# Patient Record
Sex: FEMALE | Race: WHITE | Employment: UNEMPLOYED | URBAN - METROPOLITAN AREA
[De-identification: names, ages, dates, MRNs, and addresses within clinical notes are randomized per-mention and may not be internally consistent; named-entity substitution may affect disease eponyms.]

---
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
## 2023-03-22 ENCOUNTER — APPOINTMENT (OUTPATIENT)
Dept: GENERAL RADIOLOGY | Age: 18
End: 2023-03-22
Attending: PEDIATRICS
Payer: MEDICAID

## 2023-03-22 ENCOUNTER — HOSPITAL ENCOUNTER (EMERGENCY)
Age: 18
Discharge: HOME | End: 2023-03-22
Attending: PEDIATRICS
Payer: MEDICAID

## 2023-03-22 VITALS
SYSTOLIC BLOOD PRESSURE: 118 MMHG | BODY MASS INDEX: 36.33 KG/M2 | TEMPERATURE: 97.8 F | DIASTOLIC BLOOD PRESSURE: 90 MMHG | WEIGHT: 198.63 LBS | RESPIRATION RATE: 29 BRPM | HEART RATE: 127 BPM | OXYGEN SATURATION: 99 %

## 2023-03-22 DIAGNOSIS — R07.89 ATYPICAL CHEST PAIN: Primary | ICD-10-CM

## 2023-03-22 DIAGNOSIS — J06.9 UPPER RESPIRATORY TRACT INFECTION, UNSPECIFIED TYPE: ICD-10-CM

## 2023-03-22 LAB
ALBUMIN SERPL-MCNC: 3.7 G/DL (ref 3.5–5)
ALBUMIN/GLOB SERPL: 0.9 (ref 1.1–2.2)
ALP SERPL-CCNC: 88 U/L (ref 45–117)
ALT SERPL-CCNC: 29 U/L (ref 12–78)
ANION GAP SERPL CALC-SCNC: 2 MMOL/L (ref 5–15)
AST SERPL-CCNC: 17 U/L (ref 15–37)
BASOPHILS # BLD: 0 K/UL (ref 0–0.1)
BASOPHILS NFR BLD: 0 % (ref 0–1)
BILIRUB SERPL-MCNC: 0.3 MG/DL (ref 0.2–1)
BUN SERPL-MCNC: 9 MG/DL (ref 6–20)
BUN/CREAT SERPL: 15 (ref 12–20)
CALCIUM SERPL-MCNC: 9.8 MG/DL (ref 8.5–10.1)
CHLORIDE SERPL-SCNC: 110 MMOL/L (ref 97–108)
CO2 SERPL-SCNC: 26 MMOL/L (ref 21–32)
COMMENT, HOLDF: NORMAL
CREAT SERPL-MCNC: 0.62 MG/DL (ref 0.55–1.02)
D DIMER PPP FEU-MCNC: 0.36 MG/L FEU (ref 0–0.65)
DATE LAST DOSE: NORMAL
DIFFERENTIAL METHOD BLD: NORMAL
EOSINOPHIL # BLD: 0.3 K/UL (ref 0–0.4)
EOSINOPHIL NFR BLD: 3 % (ref 0–7)
ERYTHROCYTE [DISTWIDTH] IN BLOOD BY AUTOMATED COUNT: 13.2 % (ref 11.5–14.5)
GLOBULIN SER CALC-MCNC: 4 G/DL (ref 2–4)
GLUCOSE SERPL-MCNC: 84 MG/DL (ref 65–100)
HCT VFR BLD AUTO: 40.1 % (ref 35–47)
HGB BLD-MCNC: 12.6 G/DL (ref 11.5–16)
IMM GRANULOCYTES # BLD AUTO: 0 K/UL (ref 0–0.04)
IMM GRANULOCYTES NFR BLD AUTO: 0 % (ref 0–0.5)
LITHIUM SERPL-SCNC: 0.88 MMOL/L (ref 0.6–1.2)
LYMPHOCYTES # BLD: 1.5 K/UL (ref 0.8–3.5)
LYMPHOCYTES NFR BLD: 18 % (ref 12–49)
MCH RBC QN AUTO: 26 PG (ref 26–34)
MCHC RBC AUTO-ENTMCNC: 31.4 G/DL (ref 30–36.5)
MCV RBC AUTO: 82.7 FL (ref 80–99)
MONOCYTES # BLD: 0.7 K/UL (ref 0–1)
MONOCYTES NFR BLD: 9 % (ref 5–13)
NEUTS SEG # BLD: 5.8 K/UL (ref 1.8–8)
NEUTS SEG NFR BLD: 70 % (ref 32–75)
NRBC # BLD: 0 K/UL (ref 0–0.01)
NRBC BLD-RTO: 0 PER 100 WBC
PLATELET # BLD AUTO: 254 K/UL (ref 150–400)
PMV BLD AUTO: 9.5 FL (ref 8.9–12.9)
POTASSIUM SERPL-SCNC: 3.9 MMOL/L (ref 3.5–5.1)
PROT SERPL-MCNC: 7.7 G/DL (ref 6.4–8.2)
RBC # BLD AUTO: 4.85 M/UL (ref 3.8–5.2)
REPORTED DOSE,DOSE: NORMAL UNITS
REPORTED DOSE/TIME,TMG: NORMAL
SAMPLES BEING HELD,HOLD: NORMAL
SODIUM SERPL-SCNC: 138 MMOL/L (ref 136–145)
TROPONIN I SERPL HS-MCNC: <3 NG/L (ref 0–37)
WBC # BLD AUTO: 8.3 K/UL (ref 3.6–11)

## 2023-03-22 PROCEDURE — 74011250636 HC RX REV CODE- 250/636: Performed by: PEDIATRICS

## 2023-03-22 PROCEDURE — 80178 ASSAY OF LITHIUM: CPT

## 2023-03-22 PROCEDURE — 99285 EMERGENCY DEPT VISIT HI MDM: CPT

## 2023-03-22 PROCEDURE — 36415 COLL VENOUS BLD VENIPUNCTURE: CPT

## 2023-03-22 PROCEDURE — 85379 FIBRIN DEGRADATION QUANT: CPT

## 2023-03-22 PROCEDURE — 85025 COMPLETE CBC W/AUTO DIFF WBC: CPT

## 2023-03-22 PROCEDURE — 84484 ASSAY OF TROPONIN QUANT: CPT

## 2023-03-22 PROCEDURE — 93005 ELECTROCARDIOGRAM TRACING: CPT

## 2023-03-22 PROCEDURE — 80053 COMPREHEN METABOLIC PANEL: CPT

## 2023-03-22 PROCEDURE — 96374 THER/PROPH/DIAG INJ IV PUSH: CPT

## 2023-03-22 PROCEDURE — 71045 X-RAY EXAM CHEST 1 VIEW: CPT

## 2023-03-22 PROCEDURE — 74011250637 HC RX REV CODE- 250/637: Performed by: PEDIATRICS

## 2023-03-22 RX ORDER — IBUPROFEN 600 MG/1
600 TABLET ORAL
Qty: 20 TABLET | Refills: 0 | Status: SHIPPED | OUTPATIENT
Start: 2023-03-22

## 2023-03-22 RX ORDER — DIPHENHYDRAMINE HCL 25 MG
25 TABLET ORAL
Qty: 20 TABLET | Refills: 0 | Status: SHIPPED | OUTPATIENT
Start: 2023-03-22

## 2023-03-22 RX ORDER — DIPHENHYDRAMINE HCL 25 MG
50 CAPSULE ORAL
Status: COMPLETED | OUTPATIENT
Start: 2023-03-22 | End: 2023-03-22

## 2023-03-22 RX ORDER — OXYMETAZOLINE HCL 0.05 %
2 SPRAY, NON-AEROSOL (ML) NASAL 2 TIMES DAILY
Qty: 1 EACH | Refills: 0 | Status: SHIPPED | OUTPATIENT
Start: 2023-03-22 | End: 2023-03-25

## 2023-03-22 RX ORDER — KETOROLAC TROMETHAMINE 30 MG/ML
15 INJECTION, SOLUTION INTRAMUSCULAR; INTRAVENOUS
Status: COMPLETED | OUTPATIENT
Start: 2023-03-22 | End: 2023-03-22

## 2023-03-22 RX ORDER — OXYMETAZOLINE HCL 0.05 %
1 SPRAY, NON-AEROSOL (ML) NASAL
Status: COMPLETED | OUTPATIENT
Start: 2023-03-22 | End: 2023-03-22

## 2023-03-22 RX ADMIN — SODIUM CHLORIDE 1000 ML: 9 INJECTION, SOLUTION INTRAVENOUS at 10:20

## 2023-03-22 RX ADMIN — KETOROLAC TROMETHAMINE 15 MG: 30 INJECTION, SOLUTION INTRAMUSCULAR; INTRAVENOUS at 10:24

## 2023-03-22 RX ADMIN — OXYMETAZOLINE HCL 1 SPRAY: 0.05 SPRAY NASAL at 10:22

## 2023-03-22 RX ADMIN — DIPHENHYDRAMINE HYDROCHLORIDE 50 MG: 25 CAPSULE ORAL at 10:24

## 2023-03-22 NOTE — ED TRIAGE NOTES
To ED with chest pain started this past weekend, today while at day support continued to complain of pain.

## 2023-03-22 NOTE — ED PROVIDER NOTES
The history is provided by the patient. Chest Pain (Angina)   This is a new problem. The current episode started 2 days ago. The problem has not changed since onset. The problem occurs constantly. The pain is associated with normal activity. The pain is mild. The quality of the pain is described as pressure-like. The pain does not radiate. The symptoms are aggravated by deep breathing. Associated symptoms include cough. Pertinent negatives include no abdominal pain, no diaphoresis, no dizziness, no headaches, no malaise/fatigue, no nausea, no shortness of breath, no vomiting and no weakness. Associated symptoms comments: Stuffy nose and dry mouth. She has tried nothing for the symptoms. Risk factors include obesity (Hx of high HR, was opn a med, can't remeber what, stopped a while ago. Lives in group home, but they did not hav a record of this med. On lithium but may not be taking it). [facility]    Past Medical History:   Diagnosis Date    ADHD     Autism spectrum     [facility]     CARL (generalized anxiety disorder)     MDD (major depressive disorder)     Oppositional defiant behavior        No past surgical history on file. History reviewed. No pertinent family history.     Social History     Socioeconomic History    Marital status: SINGLE     Spouse name: Not on file    Number of children: Not on file    Years of education: Not on file    Highest education level: Not on file   Occupational History    Not on file   Tobacco Use    Smoking status: Unknown    Smokeless tobacco: Not on file   Substance and Sexual Activity    Alcohol use: Not on file    Drug use: Not on file    Sexual activity: Not on file   Other Topics Concern    Not on file   Social History Narrative    Not on file     Social Determinants of Health     Financial Resource Strain: Not on file   Food Insecurity: Not on file   Transportation Needs: Not on file   Physical Activity: Not on file   Stress: Not on file   Social Connections: Not on file   Intimate Partner Violence: Not on file   Housing Stability: Not on file         ALLERGIES: Seroquel [quetiapine]    Review of Systems   Constitutional:  Negative for diaphoresis and malaise/fatigue. Respiratory:  Positive for cough. Negative for shortness of breath. Cardiovascular:  Positive for chest pain. Gastrointestinal:  Negative for abdominal pain, nausea and vomiting. Neurological:  Negative for dizziness, weakness and headaches. ROS limited by age    Vitals:    03/22/23 0952 03/22/23 0955   BP: (!) 118/90    Pulse: 96    Resp: 18    Temp:  97.8 °F (36.6 °C)   SpO2: 99%    Weight: 90.1 kg (198 lb 10.2 oz)             Physical Exam   Physical Exam   Constitutional: Appears well-developed and well-nourished. active. No distress. HENT:   Head: NCAT  Ears: Right Ear: Tympanic membrane normal. Left Ear: Tympanic membrane normal.   Nose: Nose normal. No nasal discharge. Swollen turbinates  Mouth/Throat: Mucous membranes are moist. Pharynx is normal. Dry lips  Eyes: Conjunctivae are normal. Right eye exhibits no discharge. Left eye exhibits no discharge. Neck: Normal range of motion. Neck supple. Cardiovascular: Normal rate, regular rhythm, S1 normal and S2 normal. No murmur   2+ distal pulses   Pulmonary/Chest: Effort normal and breath sounds normal. No nasal flaring or stridor. No respiratory distress. no wheezes. no rhonchi. no rales. no retraction. Some mild chest tenderness  Abdominal: Soft. Obese. No tenderness. no guarding. No hernia. No masses or HSM  Musculoskeletal: Normal range of motion. no edema, no tenderness, no deformity and no signs of injury. Lymphadenopathy:   no cervical adenopathy. Neurological:  alert. normal strength. normal muscle tone. No focal defecits  Skin: Skin is warm and dry. Capillary refill takes less than 3 seconds. Turgor is normal. No petechiae, no purpura and no rash noted. No cyanosis.     Medical Decision Making  Amount and/or Complexity of Data Reviewed  Independent Historian: caregiver  External Data Reviewed: labs and notes. Labs: ordered. Radiology: ordered. Decision-making details documented in ED Course. ECG/medicine tests: ordered and independent interpretation performed. Decision-making details documented in ED Course. Risk  OTC drugs. Prescription drug management. PAtient looks well. Seems more like a URI with some cough and chest wall tenderness. Tachy to just over 100. Will check trop, D dimer, CXR and labs. Afrin and benadryl now. ED EKG interpretation:  Rhythm: normal sinus rhythm with tachy; and regular . Rate (approx.): 109; Axis: normal; QRS interval: normal ; ST/T wave: normal; QTc 444; This EKG was interpreted by Cris Jo MD, ED Provider. Recent Results (from the past 24 hour(s))   EKG, 12 LEAD, INITIAL    Collection Time: 03/22/23  9:43 AM   Result Value Ref Range    Ventricular Rate 109 BPM    Atrial Rate 109 BPM    P-R Interval 144 ms    QRS Duration 70 ms    Q-T Interval 330 ms    QTC Calculation (Bezet) 444 ms    Calculated P Axis 39 degrees    Calculated R Axis 9 degrees    Calculated T Axis 32 degrees    Diagnosis       Sinus tachycardia  Otherwise normal ECG  No previous ECGs available     CBC WITH AUTOMATED DIFF    Collection Time: 03/22/23 10:20 AM   Result Value Ref Range    WBC 8.3 3.6 - 11.0 K/uL    RBC 4.85 3.80 - 5.20 M/uL    HGB 12.6 11.5 - 16.0 g/dL    HCT 40.1 35.0 - 47.0 %    MCV 82.7 80.0 - 99.0 FL    MCH 26.0 26.0 - 34.0 PG    MCHC 31.4 30.0 - 36.5 g/dL    RDW 13.2 11.5 - 14.5 %    PLATELET 442 414 - 442 K/uL    MPV 9.5 8.9 - 12.9 FL    NRBC 0.0 0  WBC    ABSOLUTE NRBC 0.00 0.00 - 0.01 K/uL    NEUTROPHILS 70 32 - 75 %    LYMPHOCYTES 18 12 - 49 %    MONOCYTES 9 5 - 13 %    EOSINOPHILS 3 0 - 7 %    BASOPHILS 0 0 - 1 %    IMMATURE GRANULOCYTES 0 0.0 - 0.5 %    ABS. NEUTROPHILS 5.8 1.8 - 8.0 K/UL    ABS. LYMPHOCYTES 1.5 0.8 - 3.5 K/UL    ABS.  MONOCYTES 0.7 0.0 - 1.0 K/UL    ABS. EOSINOPHILS 0.3 0.0 - 0.4 K/UL    ABS. BASOPHILS 0.0 0.0 - 0.1 K/UL    ABS. IMM. GRANS. 0.0 0.00 - 0.04 K/UL    DF AUTOMATED     METABOLIC PANEL, COMPREHENSIVE    Collection Time: 03/22/23 10:20 AM   Result Value Ref Range    Sodium 138 136 - 145 mmol/L    Potassium 3.9 3.5 - 5.1 mmol/L    Chloride 110 (H) 97 - 108 mmol/L    CO2 26 21 - 32 mmol/L    Anion gap 2 (L) 5 - 15 mmol/L    Glucose 84 65 - 100 mg/dL    BUN 9 6 - 20 MG/DL    Creatinine 0.62 0.55 - 1.02 MG/DL    BUN/Creatinine ratio 15 12 - 20      eGFR >60 >60 ml/min/1.73m2    Calcium 9.8 8.5 - 10.1 MG/DL    Bilirubin, total 0.3 0.2 - 1.0 MG/DL    ALT (SGPT) 29 12 - 78 U/L    AST (SGOT) 17 15 - 37 U/L    Alk. phosphatase 88 45 - 117 U/L    Protein, total 7.7 6.4 - 8.2 g/dL    Albumin 3.7 3.5 - 5.0 g/dL    Globulin 4.0 2.0 - 4.0 g/dL    A-G Ratio 0.9 (L) 1.1 - 2.2     TROPONIN-HIGH SENSITIVITY    Collection Time: 03/22/23 10:20 AM   Result Value Ref Range    Troponin-High Sensitivity <3 0 - 37 ng/L   D DIMER    Collection Time: 03/22/23 10:20 AM   Result Value Ref Range    D-dimer 0.36 0.00 - 0.65 mg/L FEU   SAMPLES BEING HELD    Collection Time: 03/22/23 10:20 AM   Result Value Ref Range    SAMPLES BEING HELD 1RED     COMMENT        Add-on orders for these samples will be processed based on acceptable specimen integrity and analyte stability, which may vary by analyte. XR CHEST PORT    Result Date: 3/22/2023  EXAM: XR CHEST PORT INDICATION: Unspecified chest pain started 5 days ago and returned today. Autism and bipolar. COMPARISON: None TECHNIQUE: Semiupright portable chest AP view FINDINGS: Cardiac monitoring wires overlie the thorax. The cardiomediastinal and hilar contours are within normal limits. The pulmonary vasculature is within normal limits. Low lung volumes. No focal airspace opacity. No pneumothorax. The visualized bones and upper abdomen are age-appropriate. No acute process on limited portable chest view.       Patient is well hydrated, well appearing, and in no respiratory distress. Physical exam is reassuring, and without signs of serious illness. Symptoms likely secondary to a viral URI. No evidence of wheezing or tachypnea to suggest lower airway involvement. Will discharge patient home with supportive care, and follow-up with PCP within the next few days. ICD-10-CM ICD-9-CM   1. Atypical chest pain  R07.89 786.59   2. Upper respiratory tract infection, unspecified type  J06.9 465.9       Current Discharge Medication List        START taking these medications    Details   oxymetazoline (Afrin, oxymetazoline,) 0.05 % nasal spray 2 Sprays by Both Nostrils route two (2) times a day for 3 days. Qty: 1 Each, Refills: 0  Start date: 3/22/2023, End date: 3/25/2023      ibuprofen (MOTRIN) 600 mg tablet Take 1 Tablet by mouth every six (6) hours as needed for Pain. Qty: 20 Tablet, Refills: 0  Start date: 3/22/2023      diphenhydrAMINE (Benadryl Allergy) 25 mg tablet Take 1 Tablet by mouth every six (6) hours as needed for Congestion. Qty: 20 Tablet, Refills: 0  Start date: 3/22/2023             Follow-up Information       Follow up With Specialties Details Why Contact Info    Primary care  In 2 days              I have reviewed discharge instructions with the patient. The patient verbalized understanding. 12:40 PM  Dimitri Brooke M.D.       Procedures

## 2023-03-22 NOTE — DISCHARGE INSTRUCTIONS
Recent Results (from the past 24 hour(s))   EKG, 12 LEAD, INITIAL    Collection Time: 03/22/23  9:43 AM   Result Value Ref Range    Ventricular Rate 109 BPM    Atrial Rate 109 BPM    P-R Interval 144 ms    QRS Duration 70 ms    Q-T Interval 330 ms    QTC Calculation (Bezet) 444 ms    Calculated P Axis 39 degrees    Calculated R Axis 9 degrees    Calculated T Axis 32 degrees    Diagnosis       Sinus tachycardia  Otherwise normal ECG  No previous ECGs available     CBC WITH AUTOMATED DIFF    Collection Time: 03/22/23 10:20 AM   Result Value Ref Range    WBC 8.3 3.6 - 11.0 K/uL    RBC 4.85 3.80 - 5.20 M/uL    HGB 12.6 11.5 - 16.0 g/dL    HCT 40.1 35.0 - 47.0 %    MCV 82.7 80.0 - 99.0 FL    MCH 26.0 26.0 - 34.0 PG    MCHC 31.4 30.0 - 36.5 g/dL    RDW 13.2 11.5 - 14.5 %    PLATELET 819 922 - 700 K/uL    MPV 9.5 8.9 - 12.9 FL    NRBC 0.0 0  WBC    ABSOLUTE NRBC 0.00 0.00 - 0.01 K/uL    NEUTROPHILS 70 32 - 75 %    LYMPHOCYTES 18 12 - 49 %    MONOCYTES 9 5 - 13 %    EOSINOPHILS 3 0 - 7 %    BASOPHILS 0 0 - 1 %    IMMATURE GRANULOCYTES 0 0.0 - 0.5 %    ABS. NEUTROPHILS 5.8 1.8 - 8.0 K/UL    ABS. LYMPHOCYTES 1.5 0.8 - 3.5 K/UL    ABS. MONOCYTES 0.7 0.0 - 1.0 K/UL    ABS. EOSINOPHILS 0.3 0.0 - 0.4 K/UL    ABS. BASOPHILS 0.0 0.0 - 0.1 K/UL    ABS. IMM. GRANS. 0.0 0.00 - 0.04 K/UL    DF AUTOMATED     METABOLIC PANEL, COMPREHENSIVE    Collection Time: 03/22/23 10:20 AM   Result Value Ref Range    Sodium 138 136 - 145 mmol/L    Potassium 3.9 3.5 - 5.1 mmol/L    Chloride 110 (H) 97 - 108 mmol/L    CO2 26 21 - 32 mmol/L    Anion gap 2 (L) 5 - 15 mmol/L    Glucose 84 65 - 100 mg/dL    BUN 9 6 - 20 MG/DL    Creatinine 0.62 0.55 - 1.02 MG/DL    BUN/Creatinine ratio 15 12 - 20      eGFR >60 >60 ml/min/1.73m2    Calcium 9.8 8.5 - 10.1 MG/DL    Bilirubin, total 0.3 0.2 - 1.0 MG/DL    ALT (SGPT) 29 12 - 78 U/L    AST (SGOT) 17 15 - 37 U/L    Alk.  phosphatase 88 45 - 117 U/L    Protein, total 7.7 6.4 - 8.2 g/dL    Albumin 3.7 3.5 - 5.0 g/dL    Globulin 4.0 2.0 - 4.0 g/dL    A-G Ratio 0.9 (L) 1.1 - 2.2     TROPONIN-HIGH SENSITIVITY    Collection Time: 03/22/23 10:20 AM   Result Value Ref Range    Troponin-High Sensitivity <3 0 - 37 ng/L   D DIMER    Collection Time: 03/22/23 10:20 AM   Result Value Ref Range    D-dimer 0.36 0.00 - 0.65 mg/L FEU   SAMPLES BEING HELD    Collection Time: 03/22/23 10:20 AM   Result Value Ref Range    SAMPLES BEING HELD 1RED     COMMENT        Add-on orders for these samples will be processed based on acceptable specimen integrity and analyte stability, which may vary by analyte. XR CHEST PORT    Result Date: 3/22/2023  EXAM: XR CHEST PORT INDICATION: Unspecified chest pain started 5 days ago and returned today. Autism and bipolar. COMPARISON: None TECHNIQUE: Semiupright portable chest AP view FINDINGS: Cardiac monitoring wires overlie the thorax. The cardiomediastinal and hilar contours are within normal limits. The pulmonary vasculature is within normal limits. Low lung volumes. No focal airspace opacity. No pneumothorax. The visualized bones and upper abdomen are age-appropriate. No acute process on limited portable chest view.

## 2023-03-22 NOTE — ED NOTES
Pt's legal guardian is Gaudencio Wilson [facility]. Spoke with guardian who gives verbal permission to treat pt. Verified with Harleen Diaz RN.

## 2023-03-23 LAB
ATRIAL RATE: 109 BPM
CALCULATED P AXIS, ECG09: 39 DEGREES
CALCULATED R AXIS, ECG10: 9 DEGREES
CALCULATED T AXIS, ECG11: 32 DEGREES
DIAGNOSIS, 93000: NORMAL
P-R INTERVAL, ECG05: 144 MS
Q-T INTERVAL, ECG07: 330 MS
QRS DURATION, ECG06: 70 MS
QTC CALCULATION (BEZET), ECG08: 444 MS
VENTRICULAR RATE, ECG03: 109 BPM

## 2023-03-27 ENCOUNTER — HOSPITAL ENCOUNTER (EMERGENCY)
Age: 18
Discharge: HOME | End: 2023-03-27
Attending: STUDENT IN AN ORGANIZED HEALTH CARE EDUCATION/TRAINING PROGRAM
Payer: MEDICAID

## 2023-03-27 VITALS
HEART RATE: 95 BPM | RESPIRATION RATE: 16 BRPM | OXYGEN SATURATION: 96 % | TEMPERATURE: 97.8 F | SYSTOLIC BLOOD PRESSURE: 113 MMHG | DIASTOLIC BLOOD PRESSURE: 69 MMHG

## 2023-03-27 DIAGNOSIS — R45.851 SUICIDAL IDEATION: Primary | ICD-10-CM

## 2023-03-27 LAB
ALBUMIN SERPL-MCNC: 4 G/DL (ref 3.5–5)
ALBUMIN/GLOB SERPL: 1 (ref 1.1–2.2)
ALP SERPL-CCNC: 92 U/L (ref 45–117)
ALT SERPL-CCNC: 24 U/L (ref 12–78)
ANION GAP SERPL CALC-SCNC: 3 MMOL/L (ref 5–15)
APAP SERPL-MCNC: <2 UG/ML (ref 10–30)
AST SERPL-CCNC: 21 U/L (ref 15–37)
BASOPHILS # BLD: 0.1 K/UL (ref 0–0.1)
BASOPHILS NFR BLD: 0 % (ref 0–1)
BILIRUB SERPL-MCNC: 0.3 MG/DL (ref 0.2–1)
BUN SERPL-MCNC: 8 MG/DL (ref 6–20)
BUN/CREAT SERPL: 12 (ref 12–20)
CALCIUM SERPL-MCNC: 9.4 MG/DL (ref 8.5–10.1)
CHLORIDE SERPL-SCNC: 110 MMOL/L (ref 97–108)
CO2 SERPL-SCNC: 25 MMOL/L (ref 21–32)
CREAT SERPL-MCNC: 0.65 MG/DL (ref 0.55–1.02)
DIFFERENTIAL METHOD BLD: ABNORMAL
EOSINOPHIL # BLD: 0.2 K/UL (ref 0–0.4)
EOSINOPHIL NFR BLD: 2 % (ref 0–7)
ERYTHROCYTE [DISTWIDTH] IN BLOOD BY AUTOMATED COUNT: 13.1 % (ref 11.5–14.5)
ETHANOL SERPL-MCNC: <10 MG/DL
FLUAV RNA SPEC QL NAA+PROBE: NOT DETECTED
FLUBV RNA SPEC QL NAA+PROBE: NOT DETECTED
GLOBULIN SER CALC-MCNC: 3.9 G/DL (ref 2–4)
GLUCOSE SERPL-MCNC: 82 MG/DL (ref 65–100)
HCT VFR BLD AUTO: 40.4 % (ref 35–47)
HGB BLD-MCNC: 13 G/DL (ref 11.5–16)
IMM GRANULOCYTES # BLD AUTO: 0.1 K/UL (ref 0–0.04)
IMM GRANULOCYTES NFR BLD AUTO: 0 % (ref 0–0.5)
LYMPHOCYTES # BLD: 2 K/UL (ref 0.8–3.5)
LYMPHOCYTES NFR BLD: 15 % (ref 12–49)
MCH RBC QN AUTO: 26.1 PG (ref 26–34)
MCHC RBC AUTO-ENTMCNC: 32.2 G/DL (ref 30–36.5)
MCV RBC AUTO: 81.1 FL (ref 80–99)
MONOCYTES # BLD: 0.6 K/UL (ref 0–1)
MONOCYTES NFR BLD: 5 % (ref 5–13)
NEUTS SEG # BLD: 10 K/UL (ref 1.8–8)
NEUTS SEG NFR BLD: 78 % (ref 32–75)
NRBC # BLD: 0 K/UL (ref 0–0.01)
NRBC BLD-RTO: 0 PER 100 WBC
PLATELET # BLD AUTO: 266 K/UL (ref 150–400)
PMV BLD AUTO: 9.1 FL (ref 8.9–12.9)
POTASSIUM SERPL-SCNC: 3.6 MMOL/L (ref 3.5–5.1)
PROT SERPL-MCNC: 7.9 G/DL (ref 6.4–8.2)
RBC # BLD AUTO: 4.98 M/UL (ref 3.8–5.2)
SALICYLATES SERPL-MCNC: <1.7 MG/DL (ref 2.8–20)
SARS-COV-2 RNA RESP QL NAA+PROBE: NOT DETECTED
SODIUM SERPL-SCNC: 138 MMOL/L (ref 136–145)
WBC # BLD AUTO: 12.9 K/UL (ref 3.6–11)

## 2023-03-27 PROCEDURE — 80143 DRUG ASSAY ACETAMINOPHEN: CPT

## 2023-03-27 PROCEDURE — 99285 EMERGENCY DEPT VISIT HI MDM: CPT

## 2023-03-27 PROCEDURE — 80053 COMPREHEN METABOLIC PANEL: CPT

## 2023-03-27 PROCEDURE — 80179 DRUG ASSAY SALICYLATE: CPT

## 2023-03-27 PROCEDURE — 90791 PSYCH DIAGNOSTIC EVALUATION: CPT

## 2023-03-27 PROCEDURE — 85025 COMPLETE CBC W/AUTO DIFF WBC: CPT

## 2023-03-27 PROCEDURE — 36415 COLL VENOUS BLD VENIPUNCTURE: CPT

## 2023-03-27 PROCEDURE — 82077 ASSAY SPEC XCP UR&BREATH IA: CPT

## 2023-03-27 PROCEDURE — 87636 SARSCOV2 & INF A&B AMP PRB: CPT

## 2023-03-27 RX ORDER — LORAZEPAM 0.5 MG/1
0.5 TABLET ORAL
Status: DISCONTINUED | OUTPATIENT
Start: 2023-03-27 | End: 2023-03-28 | Stop reason: HOSPADM

## 2023-03-27 NOTE — ED TRIAGE NOTES
Patient arrives [facility] from group home for anxiety and suicidal ideation. When EMS asked if pt had a plan they said \"I don't want to tell you. \" Pt had gotten in verbal altercation and staff called 911 for panic attack. Pt states anxiety and SI, and says she was recently at [facility] for having \"a slow heart rate and fast heart rate at the same time. \" VSS and slightly tachycardic

## 2023-03-27 NOTE — ED NOTES
Bedside and Verbal shift change report given to Sanjay Luna (oncoming nurse) by Sharon Maher RN (offgoing nurse). Report included the following information SBAR, Kardex, ED Summary, [facility] and Recent Results.

## 2023-03-28 ENCOUNTER — HOSPITAL ENCOUNTER (EMERGENCY)
Age: 18
Discharge: OTHER INSTITUTION | End: 2023-03-28
Attending: EMERGENCY MEDICINE
Payer: MEDICAID

## 2023-03-28 VITALS
DIASTOLIC BLOOD PRESSURE: 76 MMHG | OXYGEN SATURATION: 99 % | HEART RATE: 113 BPM | TEMPERATURE: 96.4 F | SYSTOLIC BLOOD PRESSURE: 117 MMHG | RESPIRATION RATE: 20 BRPM

## 2023-03-28 DIAGNOSIS — R45.851 SUICIDAL IDEATIONS: Primary | ICD-10-CM

## 2023-03-28 LAB
AMPHET UR QL SCN: NEGATIVE
APPEARANCE UR: CLEAR
BACTERIA URNS QL MICRO: ABNORMAL /HPF
BARBITURATES UR QL SCN: NEGATIVE
BENZODIAZ UR QL: NEGATIVE
BILIRUB UR QL: NEGATIVE
CANNABINOIDS UR QL SCN: NEGATIVE
COCAINE UR QL SCN: NEGATIVE
COLOR UR: ABNORMAL
DRUG SCRN COMMENT,DRGCM: NORMAL
EPITH CASTS URNS QL MICRO: ABNORMAL /LPF
GLUCOSE UR STRIP.AUTO-MCNC: NEGATIVE MG/DL
HGB UR QL STRIP: NEGATIVE
HYALINE CASTS URNS QL MICRO: ABNORMAL /LPF (ref 0–5)
KETONES UR QL STRIP.AUTO: NEGATIVE MG/DL
LEUKOCYTE ESTERASE UR QL STRIP.AUTO: ABNORMAL
METHADONE UR QL: NEGATIVE
NITRITE UR QL STRIP.AUTO: NEGATIVE
OPIATES UR QL: NEGATIVE
PCP UR QL: NEGATIVE
PH UR STRIP: 8 (ref 5–8)
PROT UR STRIP-MCNC: NEGATIVE MG/DL
RBC #/AREA URNS HPF: ABNORMAL /HPF (ref 0–5)
SP GR UR REFRACTOMETRY: 1.01 (ref 1–1.03)
UR CULT HOLD, URHOLD: NORMAL
UROBILINOGEN UR QL STRIP.AUTO: 0.2 EU/DL (ref 0.2–1)
WBC URNS QL MICRO: ABNORMAL /HPF (ref 0–4)

## 2023-03-28 PROCEDURE — 80307 DRUG TEST PRSMV CHEM ANLYZR: CPT

## 2023-03-28 PROCEDURE — 90791 PSYCH DIAGNOSTIC EVALUATION: CPT

## 2023-03-28 PROCEDURE — 81001 URINALYSIS AUTO W/SCOPE: CPT

## 2023-03-28 PROCEDURE — 99283 EMERGENCY DEPT VISIT LOW MDM: CPT

## 2023-03-28 NOTE — BSMART NOTE
BSMART assessment completed, and suicide risk level noted to be low risk. Primary Nurse and Charge Nurse Hamlet Weiss and Physician Dr Sophie Segovia notified. Concerns not observed. Security/Off- has not been notified.      ISRAEL Barnes, Supervisee in Social Work

## 2023-03-28 NOTE — ED PROVIDER NOTES
Patient is a 20-year-old female history of oppositional defiant behavior, major depressive disorder, anxiety disorder, bipolar disorder and autistic spectrum disorder presenting today with suicidal ideations. Patient got to an argument with a worker at her group home. She had transient homicidal thoughts towards this person. She keeps telling me \"I just need to die to end it\". She states that she has a plan of cutting her wrists. Denies taking alcohol or drugs today. States that she is compliant with her medications without taking extras. Denies any actual attempt to harm herself today. Has no medical complaints today. Past Medical History:   Diagnosis Date    ADHD     Autism spectrum     Bipolar disorder (HCC)     CARL (generalized anxiety disorder)     MDD (major depressive disorder)     Oppositional defiant behavior          Social History     Socioeconomic History    Marital status: SINGLE     Spouse name: Not on file    Number of children: Not on file    Years of education: Not on file    Highest education level: Not on file   Occupational History    Not on file   Tobacco Use    Smoking status: Unknown    Smokeless tobacco: Not on file   Substance and Sexual Activity    Alcohol use: Not on file    Drug use: Not on file    Sexual activity: Not on file   Other Topics Concern    Not on file   Social History Narrative    Not on file     Social Determinants of Health     Financial Resource Strain: Not on file   Food Insecurity: Not on file   Transportation Needs: Not on file   Physical Activity: Not on file   Stress: Not on file   Social Connections: Not on file   Intimate Partner Violence: Not on file   Housing Stability: Not on file         ALLERGIES: Seroquel [quetiapine]    Review of Systems   Constitutional:  Negative for chills and fever. HENT:  Negative for congestion and rhinorrhea. Eyes:  Negative for redness and visual disturbance.    Respiratory:  Negative for cough and shortness of breath. Cardiovascular:  Negative for chest pain and leg swelling. Gastrointestinal:  Negative for abdominal pain, diarrhea, nausea and vomiting. Genitourinary:  Negative for dysuria, flank pain, frequency, hematuria and urgency. Musculoskeletal:  Negative for arthralgias, back pain, myalgias and neck pain. Skin:  Negative for rash and wound. Allergic/Immunologic: Negative for immunocompromised state. Neurological:  Negative for dizziness and headaches. Psychiatric/Behavioral:  Positive for suicidal ideas. Vitals:    03/27/23 1938   BP: 113/69   Pulse: 95   Resp: 16   Temp: 97.8 °F (36.6 °C)   SpO2: 96%            Physical Exam  Vitals and nursing note reviewed. Constitutional:       General: She is not in acute distress. Appearance: She is well-developed. She is not diaphoretic. HENT:      Head: Normocephalic. Mouth/Throat:      Pharynx: No oropharyngeal exudate. Eyes:      General:         Right eye: No discharge. Left eye: No discharge. Pupils: Pupils are equal, round, and reactive to light. Cardiovascular:      Rate and Rhythm: Normal rate and regular rhythm. Heart sounds: Normal heart sounds. No murmur heard. No friction rub. No gallop. Pulmonary:      Effort: Pulmonary effort is normal. No respiratory distress. Breath sounds: Normal breath sounds. No stridor. No wheezing or rales. Abdominal:      General: Bowel sounds are normal. There is no distension. Palpations: Abdomen is soft. Tenderness: There is no abdominal tenderness. There is no guarding or rebound. Musculoskeletal:         General: No deformity. Normal range of motion. Cervical back: Normal range of motion and neck supple. Skin:     General: Skin is warm and dry. Capillary Refill: Capillary refill takes less than 2 seconds. Findings: No rash. Neurological:      Mental Status: She is alert and oriented to person, place, and time.    Psychiatric: Behavior: Behavior normal.         Thought Content: Thought content includes homicidal and suicidal ideation. Thought content includes suicidal plan. Thought content does not include homicidal plan. Medical Decision Making  Amount and/or Complexity of Data Reviewed  Labs: ordered. Procedures      Patient is a 58-year-old female presenting today for behavioral health evaluation. She is suicidal without attempts. Vital signs are stable. Labs show mild leukocytosis without anemia. Renal function except. Electrolytes okay. Acetaminophen and aspirin levels not elevated. COVID and flu negative. Awaiting alcohol, UDS and urinalysis. Once those are back she can be medically cleared. Patient signed out to Dr. Donald Bach at 9:45 PM pending the [facility]  evaluation.         Michael Garcia, DO

## 2023-03-28 NOTE — BSMART NOTE
BSMART assessment completed, and suicide risk level noted to be Moderate Risk . Charge Nurse Lily Hoskins  and Physician Jose notified. Concerns not observed. Security/Off- has not been notified.

## 2023-03-28 NOTE — ED NOTES
Bedside and Verbal shift change report given to Vidal Mendoza RN (oncoming nurse) by Bel Bains RN (offgoing nurse). Report included the following information SBAR, Kardex, ED Summary, Intake/Output, MAR, Recent Results, and Med Rec Status.
Discharge instructions given to patient by MD and RN. Pt has been given counseling regarding at home treatment plan. Pt verbalizes understanding of need to seek further treatment if symptoms worsen. Pt ambulated off of unit in no signs of distress.     Pt provided with and verbalizes understanding of safety plan
Patients group home: [facility] [phone]
Per CM pt is being picked up by staff at group home
yes

## 2023-03-28 NOTE — ED TRIAGE NOTES
Patient arrives to ED via EMS from group home with reports for SI without plan. EMS reports patient stated she wanted to harm herself and someone at group home. Patient seen yesterday and last week for the same.

## 2023-03-28 NOTE — ED NOTES
Care assumed from Dr. Dara Argueta at 10 PM.  Please see his note for full H&P.  20-year-old with suicidal ideation. Awaiting urine for medical clearance. CODEY saw the patient at the bedside and recommended discharge stating that she does not meet criteria for admission or ECO at this time. Suspect that this is all behavioral and recommends outpatient follow-up.   She will be picked up by her group home staff members

## 2023-03-28 NOTE — ED PROVIDER NOTES
The history is provided by the patient. No  was used. Mental Health Problem   This is a recurrent problem. The current episode started more than 1 week ago. The problem has not changed since onset. Associated symptoms include self-injury. Pertinent negatives include no confusion, no seizures, no weakness, no hallucinations and no numbness. Mental status baseline is normal.  Functional status baseline:  [EPIC#1537^NOTE} Her past medical history is significant for depression. Past Medical History:   Diagnosis Date    ADHD     Autism spectrum     Bipolar disorder ([facility] [address]     CARL (generalized anxiety disorder)     MDD (major depressive disorder)     Oppositional defiant behavior        No past surgical history on file. No family history on file. Social History     Socioeconomic History    Marital status: SINGLE     Spouse name: Not on file    Number of children: Not on file    Years of education: Not on file    Highest education level: Not on file   Occupational History    Not on file   Tobacco Use    Smoking status: Unknown    Smokeless tobacco: Not on file   Substance and Sexual Activity    Alcohol use: Not on file    Drug use: Not on file    Sexual activity: Not on file   Other Topics Concern    Not on file   Social History Narrative    Not on file     Social Determinants of Health     Financial Resource Strain: Not on file   Food Insecurity: Not on file   Transportation Needs: Not on file   Physical Activity: Not on file   Stress: Not on file   Social Connections: Not on file   Intimate Partner Violence: Not on file   Housing Stability: Not on file         ALLERGIES: Seroquel [quetiapine]    Review of Systems   Constitutional:  Negative for activity change, chills and fever. HENT:  Negative for nosebleeds, sore throat, trouble swallowing and voice change. Eyes:  Negative for visual disturbance. Respiratory:  Negative for shortness of breath.     Cardiovascular:  Negative for chest pain and palpitations. Gastrointestinal:  Negative for abdominal pain, constipation, diarrhea and nausea. Genitourinary:  Negative for difficulty urinating, dysuria, hematuria and urgency. Musculoskeletal:  Negative for back pain, neck pain and neck stiffness. Skin:  Negative for color change. Allergic/Immunologic: Negative for immunocompromised state. Neurological:  Negative for dizziness, seizures, syncope, weakness, light-headedness, numbness and headaches. Psychiatric/Behavioral:  Positive for self-injury and suicidal ideas. Negative for behavioral problems, confusion and hallucinations. Vitals:    03/28/23 1412   BP: (!) 136/92   Pulse: (!) 111   Resp: 20   Temp: 98.3 °F (36.8 °C)   SpO2: 95%            Physical Exam  Vitals and nursing note reviewed. Constitutional:       General: She is not in acute distress. Appearance: She is well-developed. She is not diaphoretic. HENT:      Head: Atraumatic. Neck:      Trachea: No tracheal deviation. Cardiovascular:      Comments: Warm and well perfused  Pulmonary:      Effort: Pulmonary effort is normal. No respiratory distress. Musculoskeletal:         General: Normal range of motion. Skin:     General: Skin is warm and dry. Neurological:      Mental Status: She is alert. Coordination: Coordination normal.   Psychiatric:         Attention and Perception: Attention normal.         Mood and Affect: Mood is depressed. Speech: Speech normal.         Behavior: Behavior normal.         Thought Content: Thought content includes suicidal ideation. Thought content includes suicidal plan. Judgment: Judgment normal.        Medical Decision Making  Amount and/or Complexity of Data Reviewed  Labs: ordered. This is a 20-year-old female with past medical history, review of systems, physical exam as above, presenting complaints of suicidal ideation.   Patient states she does not like the place where she lives, states she does not like the people she lives with, and states she has thoughts of committing suicide for the last week, states today that she has a birthday coming up and she is afraid they will want to celebrate with her. She was seen yesterday with similar complaints. She denies auditory visual hallucinations, states plan is to continue cutting, states she is compliant with her medications, denies alcohol, or drug use. Physical exam is remarkable for a well-appearing teen, in no acute distress noted to be normotensive, afebrile mildly tachycardic, satting well on room air. Chart review indicates a history of ODD, autism spectrum disorder, depression, anxiety. Suspect that her statements regarding self-harm are secondary to her ongoing anxiety and depression, low likelihood for completed suicidal efforts. Plan for evaluation by psychiatry liaison, disposition pending. Procedures    3:17 PM  Psychiatry liaison has discussed the patient with her guardian, who does not want the patient admitted to the hospital, thus will discharge with community psychiatry follow-up, return precautions given.

## 2023-03-28 NOTE — PROGRESS NOTES
3:43 PM  CM consulted to assist with patient transport back to group home. Informed patient is ambulatory. Address verified ([address] [place] [address]. [place] [address] [facility]) with [address] [place], [place] [address] [phone]. Patient resides at [facility] [phone]. CM also spoke with director, Isabela Hahn [phone] at [facility]. CM was informed that someone from the facility was in route to provide transport for patient back to group home. RN notified of updates. No other CM needs at this time.      Ric Garber, ISRAEL/CRM  Care Management

## 2023-03-28 NOTE — ED NOTES
I have reviewed discharge instructions & discussed discharge medications with the patient & caregiver . Opportunity for questions & clarifications was provided. All questions & concerns were addressed. The patient & caregiver verbalized understanding. She left ambulatory in stable condition, no acute distress noted.
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
